# Patient Record
(demographics unavailable — no encounter records)

---

## 2024-10-21 NOTE — DISCUSSION/SUMMARY
[Normal Growth] : growth [Normal Development] : development  [No Elimination Concerns] : elimination [Continue Regimen] : feeding [No Skin Concerns] : skin [Normal Sleep Pattern] : sleep [None] : no medical problems [Anticipatory Guidance Given] : Anticipatory guidance addressed as per the history of present illness section [Physical Growth and Development] : physical growth and development [Social and Academic Competence] : social and academic competence [Emotional Well-Being] : emotional well-being [Risk Reduction] : risk reduction [Violence and Injury Prevention] : violence and injury prevention [No Vaccines] : no vaccines needed [No Medications] : ~He/She~ is not on any medications [Patient] : patient [Parent/Guardian] : Parent/Guardian [Full Activity without restrictions including Physical Education & Athletics] : Full Activity without restrictions including Physical Education & Athletics [] : The components of the vaccine(s) to be administered today are listed in the plan of care. The disease(s) for which the vaccine(s) are intended to prevent and the risks have been discussed with the caretaker.  The risks are also included in the appropriate vaccination information statements which have been provided to the patient's caregiver.  The caregiver has given consent to vaccinate. [de-identified] : Watch weught.  Decrease screen time. [FreeTextEntry1] : 11 yo well male here for annual physical and immunizations.   PHQ-9 passed. CRAFFT reviewed.  Anticipatory guidance given.  Adacel and Trumenba given.  Continue balanced diet with all food groups. Brush teeth twice a day with toothbrush. Recommend visit to dentist. Maintain consistent daily routines and sleep schedule. Personal hygiene, puberty, and sexual health reviewed. Risky behaviors assessed. School discussed. Limit screen time to no more than 2 hours per day. Encourage physical activity. Return 1 year for routine well child check.

## 2024-10-21 NOTE — HISTORY OF PRESENT ILLNESS
[Mother] : mother [Yes] : Patient goes to dentist yearly [Toothpaste] : Primary Fluoride Source: Toothpaste [Needs Immunizations] : Needs immunizations [Eats meals with family] : eats meals with family [Has family members/adults to turn to for help] : has family members/adults to turn to for help [Is permitted and is able to make independent decisions] : Is permitted and is able to make independent decisions [Grade: ____] : Grade: [unfilled] [Normal Performance] : normal performance [Normal Behavior/Attention] : normal behavior/attention [Normal Homework] : normal homework [Eats regular meals including adequate fruits and vegetables] : eats regular meals including adequate fruits and vegetables [Drinks non-sweetened liquids] : drinks non-sweetened liquids  [Calcium source] : calcium source [Has friends] : has friends [Uses safety belts/safety equipment] : uses safety belts/safety equipment  [Has peer relationships free of violence] : has peer relationships free of violence [No] : Patient has not had sexual intercourse [Has ways to cope with stress] : has ways to cope with stress [Displays self-confidence] : displays self-confidence [At least 1 hour of physical activity a day] : at least 1 hour of physical activity a day [NO] : No [Sleep Concerns] : no sleep concerns [Has concerns about body or appearance] : does not have concerns about body or appearance [Screen time (except homework) less than 2 hours a day] : no screen time (except homework) less than 2 hours a day [Has interests/participates in community activities/volunteers] : does not have interests/participates in community activities/volunteers [Uses electronic nicotine delivery system] : does not use electronic nicotine delivery system [Exposure to electronic nicotine delivery system] : no exposure to electronic nicotine delivery system [Uses tobacco] : does not use tobacco [Exposure to tobacco] : no exposure to tobacco [Uses drugs] : does not use drugs  [Exposure to drugs] : no exposure to drugs [Drinks alcohol] : does not drink alcohol [Exposure to alcohol] : no exposure to alcohol [Has problems with sleep] : does not have problems with sleep [Gets depressed, anxious, or irritable/has mood swings] : does not get depressed, anxious, or irritable/has mood swings [Has thought about hurting self or considered suicide] : has not thought about hurting self or considered suicide [FreeTextEntry7] : Abdominal pain resolved, gained weight.  Parents , back at home less stress, mother would like names of therapists anyway. [de-identified] : Adacel, Menquadfi [de-identified] : Sleeps 9 h [de-identified] : working out, walking, ?tennis [FreeTextEntry1] : 13 yo well male here for annual physical and immunizations.  Last physical at our practice 2019. Pt received OT [id number].  !EP extended testing time.   Pt seen 08/11/23.  Parents , 07/23 seen at urgent care, suspicious tick bite- given Doxycycline (stayed with father due to mold issues at home.  Seen by allergist to r/o mold infection.  Persistent decreased po.  02/23 labs reviewed.  Bronson , HDL low 38, EBV IgG +.  10/30/23 Peds GI referral-lost 15 lb over 3 mo likely due to stressors such as divorce and moving.  Continue a regular diet and weight check in 2 months to ensure no additional weight loss Add fiber supplement or MiraLAX, mom prefers fiber and a high-fiber diet-discussed Follow-up 01/08/24- Anxiety- Rx MiraLAX, half a cap to 1 capful daily High calorie diet discussed Plan for high calorie beverage in the morning if skipping breakfast Regular meals encouraged Discouraged time at dad's house for now Weight check in 6 to 8 weeks- not done. Agreed with referral for therapy.

## 2024-10-21 NOTE — PHYSICAL EXAM
